# Patient Record
Sex: MALE | Race: OTHER | NOT HISPANIC OR LATINO | URBAN - METROPOLITAN AREA
[De-identification: names, ages, dates, MRNs, and addresses within clinical notes are randomized per-mention and may not be internally consistent; named-entity substitution may affect disease eponyms.]

---

## 2021-02-26 VITALS
DIASTOLIC BLOOD PRESSURE: 78 MMHG | OXYGEN SATURATION: 99 % | RESPIRATION RATE: 16 BRPM | TEMPERATURE: 98 F | HEART RATE: 88 BPM | SYSTOLIC BLOOD PRESSURE: 149 MMHG

## 2021-02-26 NOTE — H&P ADULT - NSHPSOCIALHISTORY_GEN_ALL_CORE
Denies tobacco/alcohol/illicit drug use Denies tobacco  Former alcohol use - 2 shots 5 days/week, haven't drank in 10 days  Denies illicit drug use

## 2021-02-26 NOTE — H&P ADULT - HISTORY OF PRESENT ILLNESS
Skeleton    Covid___  Cardiologist: Dr. Shaik Huerta  Pharmacy:  Escort:    59 yo M with PMHx of HTN, HLD, DM who presented to Cardiologist Dr. Shaik Huerta with c/o CP, SOB, dizziness    NST 2/16/21: moderate inferior and inferoseptal defect noted, normal LVEF.  Covid test 2/27/21 @ [place] [facility] @ 7a.   Cardiologist: Dr. Shaik Huerta  Pharmacy: [facility] - [phone]  Escort: Daughter    59 yo M with PMHx of HTN, HLD, DM who presented to Cardiologist Dr. Shaik Huerta with c/o non-radiating SSCP described as heaviness and of mild intensity upon ambulation of 1/2 city block, resolving with a couple minutes of rest, over past 6-7 blocks. Denies SOB, dizziness, diaphoresis, palpitations, fatigue, LE edema, orthopnea, PND, syncope, N/V, abdominal pain. NST 2/16/21: moderate inferior and inferoseptal defect noted, normal LVEF.  In light of pt's risk factors, CCS Anginal class III Sx, abnormal NST, pt referred for cardiac cath with possible intervention to r/o underlying CAD.  Covid test 2/27/21 @ [place] [facility] @ 7a.   Cardiologist: Dr. Shaik Huerta  Pharmacy: [facility] - [phone]  Escort: Daughter    59 yo M with PMHx of HTN, HLD, DM who presented to Cardiologist Dr. Shaik Huerta with c/o non-radiating SSCP described as heaviness and of mild intensity upon ambulation of 1/2 city block, resolving with a couple minutes of rest, over past 6-7 months. Denies SOB, dizziness, diaphoresis, palpitations, fatigue, LE edema, orthopnea, PND, syncope, N/V, abdominal pain. NST 2/16/21: moderate inferior and inferoseptal defect noted, normal LVEF.  In light of pt's risk factors, CCS Anginal class III Sx, abnormal NST, pt referred for cardiac cath with possible intervention to r/o underlying CAD.  Covid test 2/27/21 @ [place] [facility]  Cardiologist: Dr. Shaik Huerta  Pharmacy: [facility] - [phone]  Escort: Daughter    59 yo M with PMHx of HTN, HLD, DM who presented to Cardiologist Dr. Shaik Huerta with c/o non-radiating SSCP described as heaviness and of mild intensity upon ambulation of 1/2 city block, resolving with a couple minutes of rest, over past 6-7 months. Denies SOB, dizziness, diaphoresis, palpitations, fatigue, LE edema, orthopnea, PND, syncope, N/V, abdominal pain. NST 2/16/21: moderate inferior and inferoseptal defect noted, normal LVEF.  In light of pt's risk factors, CCS Anginal class III Sx, abnormal NST, pt referred for cardiac cath with possible intervention to r/o underlying CAD.

## 2021-02-26 NOTE — H&P ADULT - NSICDXFAMILYHX_GEN_ALL_CORE_FT
No pertinent family history in first degree relatives FAMILY HISTORY:  Family history of CVA, Father in his 60s

## 2021-02-26 NOTE — H&P ADULT - ASSESSMENT
61 yo M with PMHx of HTN, HLD, DM who presented to Cardiologist Dr. Shaik Huerta with c/o exertional SSCP x 6-7 months. NST 2/16/21: moderate inferior and inferoseptal defect noted, normal LVEF. Pt now presents for cardiac cath w/ possible intervention if clinically indicated.     Denies bleeding, hematuria, hematochezia, melena. Aspirin 325mg and Plavix 600mg ordered pre-cath.   Euvolemic, normal EF. NS @ 75cc/hour x 4h ordered pre-cath.   Mallampati Class   ASA Class II  Pt is a suitable candidate for moderate sedation.   Risks & benefits of procedure and alternative therapy have been explained to the patient including but not limited to: allergic reaction, bleeding w/possible need for blood transfusion, infection, renal and vascular compromise, limb damage, arrhythmia, stroke, vessel dissection/perforation, Myocardial infarction, emergent CABG. Informed consent obtained and in chart.   59 yo M with PMHx of HTN, HLD, DM who presented to Cardiologist Dr. Shaik Huerta with c/o exertional SSCP x 6-7 months. NST 2/16/21: moderate inferior and inferoseptal defect noted, normal LVEF. Pt now presents for cardiac cath w/ possible intervention if clinically indicated.     Denies bleeding, hematuria, hematochezia, melena. On aspirin 81mg daily. Aspirin 81mg and Plavix 600mg ordered pre-cath.   Euvolemic, normal EF. NS @ 75cc/hour x 4h ordered pre-cath.   ISS ordered    Mallampati Class II  ASA Class II  Pt is a suitable candidate for moderate sedation.   Risks & benefits of procedure and alternative therapy have been explained to the patient including but not limited to: allergic reaction, bleeding w/possible need for blood transfusion, infection, renal and vascular compromise, limb damage, arrhythmia, stroke, vessel dissection/perforation, Myocardial infarction, emergent CABG. Informed consent obtained and in chart.

## 2021-03-01 ENCOUNTER — INPATIENT (INPATIENT)
Facility: HOSPITAL | Age: 61
LOS: 0 days | Discharge: HOME | DRG: 247 | End: 2021-03-02
Attending: INTERNAL MEDICINE | Admitting: INTERNAL MEDICINE
Payer: MEDICAID

## 2021-03-01 ENCOUNTER — TRANSCRIPTION ENCOUNTER (OUTPATIENT)
Age: 61
End: 2021-03-01

## 2021-03-01 LAB
A1C WITH ESTIMATED AVERAGE GLUCOSE RESULT: 7.4 % — HIGH (ref 4–5.6)
ALBUMIN SERPL ELPH-MCNC: 4.8 G/DL — SIGNIFICANT CHANGE UP (ref 3.3–5)
ALP SERPL-CCNC: 80 U/L — SIGNIFICANT CHANGE UP (ref 40–120)
ALT FLD-CCNC: 41 U/L — SIGNIFICANT CHANGE UP (ref 10–45)
ANION GAP SERPL CALC-SCNC: 12 MMOL/L — SIGNIFICANT CHANGE UP (ref 5–17)
APTT BLD: 39.7 SEC — HIGH (ref 27.5–35.5)
AST SERPL-CCNC: 27 U/L — SIGNIFICANT CHANGE UP (ref 10–40)
BASOPHILS # BLD AUTO: 0.02 K/UL — SIGNIFICANT CHANGE UP (ref 0–0.2)
BASOPHILS NFR BLD AUTO: 0.4 % — SIGNIFICANT CHANGE UP (ref 0–2)
BILIRUB SERPL-MCNC: 0.7 MG/DL — SIGNIFICANT CHANGE UP (ref 0.2–1.2)
BUN SERPL-MCNC: 11 MG/DL — SIGNIFICANT CHANGE UP (ref 7–23)
CALCIUM SERPL-MCNC: 9.2 MG/DL — SIGNIFICANT CHANGE UP (ref 8.4–10.5)
CHLORIDE SERPL-SCNC: 97 MMOL/L — SIGNIFICANT CHANGE UP (ref 96–108)
CHOLEST SERPL-MCNC: 136 MG/DL — SIGNIFICANT CHANGE UP
CK MB CFR SERPL CALC: 2.2 NG/ML — SIGNIFICANT CHANGE UP (ref 0–6.7)
CK SERPL-CCNC: 153 U/L — SIGNIFICANT CHANGE UP (ref 30–200)
CO2 SERPL-SCNC: 25 MMOL/L — SIGNIFICANT CHANGE UP (ref 22–31)
CREAT SERPL-MCNC: 1.04 MG/DL — SIGNIFICANT CHANGE UP (ref 0.5–1.3)
CRP SERPL-MCNC: <0.03 MG/DL — SIGNIFICANT CHANGE UP (ref 0–0.4)
EOSINOPHIL # BLD AUTO: 0.03 K/UL — SIGNIFICANT CHANGE UP (ref 0–0.5)
EOSINOPHIL NFR BLD AUTO: 0.5 % — SIGNIFICANT CHANGE UP (ref 0–6)
ESTIMATED AVERAGE GLUCOSE: 166 MG/DL — HIGH (ref 68–114)
GLUCOSE BLDC GLUCOMTR-MCNC: 103 MG/DL — HIGH (ref 70–99)
GLUCOSE BLDC GLUCOMTR-MCNC: 125 MG/DL — HIGH (ref 70–99)
GLUCOSE BLDC GLUCOMTR-MCNC: 172 MG/DL — HIGH (ref 70–99)
GLUCOSE BLDC GLUCOMTR-MCNC: 211 MG/DL — HIGH (ref 70–99)
GLUCOSE SERPL-MCNC: 179 MG/DL — HIGH (ref 70–99)
HCT VFR BLD CALC: 39.1 % — SIGNIFICANT CHANGE UP (ref 39–50)
HDLC SERPL-MCNC: 45 MG/DL — SIGNIFICANT CHANGE UP
HGB BLD-MCNC: 13.3 G/DL — SIGNIFICANT CHANGE UP (ref 13–17)
IMM GRANULOCYTES NFR BLD AUTO: 0.4 % — SIGNIFICANT CHANGE UP (ref 0–1.5)
INR BLD: 0.98 — SIGNIFICANT CHANGE UP (ref 0.88–1.16)
LIPID PNL WITH DIRECT LDL SERPL: 63 MG/DL — SIGNIFICANT CHANGE UP
LYMPHOCYTES # BLD AUTO: 2 K/UL — SIGNIFICANT CHANGE UP (ref 1–3.3)
LYMPHOCYTES # BLD AUTO: 36.4 % — SIGNIFICANT CHANGE UP (ref 13–44)
MCHC RBC-ENTMCNC: 30.2 PG — SIGNIFICANT CHANGE UP (ref 27–34)
MCHC RBC-ENTMCNC: 34 GM/DL — SIGNIFICANT CHANGE UP (ref 32–36)
MCV RBC AUTO: 88.9 FL — SIGNIFICANT CHANGE UP (ref 80–100)
MONOCYTES # BLD AUTO: 0.42 K/UL — SIGNIFICANT CHANGE UP (ref 0–0.9)
MONOCYTES NFR BLD AUTO: 7.7 % — SIGNIFICANT CHANGE UP (ref 2–14)
NEUTROPHILS # BLD AUTO: 3 K/UL — SIGNIFICANT CHANGE UP (ref 1.8–7.4)
NEUTROPHILS NFR BLD AUTO: 54.6 % — SIGNIFICANT CHANGE UP (ref 43–77)
NON HDL CHOLESTEROL: 91 MG/DL — SIGNIFICANT CHANGE UP
NRBC # BLD: 0 /100 WBCS — SIGNIFICANT CHANGE UP (ref 0–0)
PLATELET # BLD AUTO: 205 K/UL — SIGNIFICANT CHANGE UP (ref 150–400)
POTASSIUM SERPL-MCNC: 4.2 MMOL/L — SIGNIFICANT CHANGE UP (ref 3.5–5.3)
POTASSIUM SERPL-SCNC: 4.2 MMOL/L — SIGNIFICANT CHANGE UP (ref 3.5–5.3)
PROT SERPL-MCNC: 8 G/DL — SIGNIFICANT CHANGE UP (ref 6–8.3)
PROTHROM AB SERPL-ACNC: 11.8 SEC — SIGNIFICANT CHANGE UP (ref 10.6–13.6)
RBC # BLD: 4.4 M/UL — SIGNIFICANT CHANGE UP (ref 4.2–5.8)
RBC # FLD: 11.7 % — SIGNIFICANT CHANGE UP (ref 10.3–14.5)
SODIUM SERPL-SCNC: 134 MMOL/L — LOW (ref 135–145)
TRIGL SERPL-MCNC: 139 MG/DL — SIGNIFICANT CHANGE UP
WBC # BLD: 5.49 K/UL — SIGNIFICANT CHANGE UP (ref 3.8–10.5)
WBC # FLD AUTO: 5.49 K/UL — SIGNIFICANT CHANGE UP (ref 3.8–10.5)

## 2021-03-01 PROCEDURE — 92929: CPT | Mod: LC

## 2021-03-01 PROCEDURE — 93010 ELECTROCARDIOGRAM REPORT: CPT

## 2021-03-01 PROCEDURE — 93458 L HRT ARTERY/VENTRICLE ANGIO: CPT | Mod: 26,59

## 2021-03-01 PROCEDURE — 92928 PRQ TCAT PLMT NTRAC ST 1 LES: CPT | Mod: LC

## 2021-03-01 RX ORDER — SODIUM CHLORIDE 9 MG/ML
1000 INJECTION, SOLUTION INTRAVENOUS
Refills: 0 | Status: DISCONTINUED | OUTPATIENT
Start: 2021-03-01 | End: 2021-03-01

## 2021-03-01 RX ORDER — GLUCAGON INJECTION, SOLUTION 0.5 MG/.1ML
1 INJECTION, SOLUTION SUBCUTANEOUS ONCE
Refills: 0 | Status: DISCONTINUED | OUTPATIENT
Start: 2021-03-01 | End: 2021-03-02

## 2021-03-01 RX ORDER — DEXTROSE 50 % IN WATER 50 %
12.5 SYRINGE (ML) INTRAVENOUS ONCE
Refills: 0 | Status: DISCONTINUED | OUTPATIENT
Start: 2021-03-01 | End: 2021-03-02

## 2021-03-01 RX ORDER — DEXTROSE 50 % IN WATER 50 %
25 SYRINGE (ML) INTRAVENOUS ONCE
Refills: 0 | Status: DISCONTINUED | OUTPATIENT
Start: 2021-03-01 | End: 2021-03-02

## 2021-03-01 RX ORDER — CHLORHEXIDINE GLUCONATE 213 G/1000ML
1 SOLUTION TOPICAL ONCE
Refills: 0 | Status: DISCONTINUED | OUTPATIENT
Start: 2021-03-01 | End: 2021-03-01

## 2021-03-01 RX ORDER — ASPIRIN/CALCIUM CARB/MAGNESIUM 324 MG
81 TABLET ORAL DAILY
Refills: 0 | Status: DISCONTINUED | OUTPATIENT
Start: 2021-03-02 | End: 2021-03-02

## 2021-03-01 RX ORDER — INSULIN LISPRO 100/ML
VIAL (ML) SUBCUTANEOUS ONCE
Refills: 0 | Status: DISCONTINUED | OUTPATIENT
Start: 2021-03-01 | End: 2021-03-01

## 2021-03-01 RX ORDER — DEXTROSE 50 % IN WATER 50 %
25 SYRINGE (ML) INTRAVENOUS ONCE
Refills: 0 | Status: DISCONTINUED | OUTPATIENT
Start: 2021-03-01 | End: 2021-03-01

## 2021-03-01 RX ORDER — SODIUM CHLORIDE 9 MG/ML
1000 INJECTION, SOLUTION INTRAVENOUS
Refills: 0 | Status: DISCONTINUED | OUTPATIENT
Start: 2021-03-01 | End: 2021-03-02

## 2021-03-01 RX ORDER — ROSUVASTATIN CALCIUM 5 MG/1
1 TABLET ORAL
Qty: 0 | Refills: 0 | DISCHARGE

## 2021-03-01 RX ORDER — CLOPIDOGREL BISULFATE 75 MG/1
600 TABLET, FILM COATED ORAL ONCE
Refills: 0 | Status: COMPLETED | OUTPATIENT
Start: 2021-03-01 | End: 2021-03-01

## 2021-03-01 RX ORDER — ATORVASTATIN CALCIUM 80 MG/1
10 TABLET, FILM COATED ORAL AT BEDTIME
Refills: 0 | Status: DISCONTINUED | OUTPATIENT
Start: 2021-03-01 | End: 2021-03-01

## 2021-03-01 RX ORDER — SODIUM CHLORIDE 9 MG/ML
500 INJECTION INTRAMUSCULAR; INTRAVENOUS; SUBCUTANEOUS
Refills: 0 | Status: DISCONTINUED | OUTPATIENT
Start: 2021-03-01 | End: 2021-03-01

## 2021-03-01 RX ORDER — DEXTROSE 50 % IN WATER 50 %
15 SYRINGE (ML) INTRAVENOUS ONCE
Refills: 0 | Status: DISCONTINUED | OUTPATIENT
Start: 2021-03-01 | End: 2021-03-02

## 2021-03-01 RX ORDER — INSULIN LISPRO 100/ML
VIAL (ML) SUBCUTANEOUS
Refills: 0 | Status: DISCONTINUED | OUTPATIENT
Start: 2021-03-01 | End: 2021-03-02

## 2021-03-01 RX ORDER — DEXTROSE 50 % IN WATER 50 %
15 SYRINGE (ML) INTRAVENOUS ONCE
Refills: 0 | Status: DISCONTINUED | OUTPATIENT
Start: 2021-03-01 | End: 2021-03-01

## 2021-03-01 RX ORDER — AMLODIPINE BESYLATE 2.5 MG/1
5 TABLET ORAL DAILY
Refills: 0 | Status: DISCONTINUED | OUTPATIENT
Start: 2021-03-01 | End: 2021-03-02

## 2021-03-01 RX ORDER — HYDROCHLOROTHIAZIDE 25 MG
25 TABLET ORAL DAILY
Refills: 0 | Status: DISCONTINUED | OUTPATIENT
Start: 2021-03-02 | End: 2021-03-02

## 2021-03-01 RX ORDER — SODIUM CHLORIDE 9 MG/ML
500 INJECTION INTRAMUSCULAR; INTRAVENOUS; SUBCUTANEOUS
Refills: 0 | Status: DISCONTINUED | OUTPATIENT
Start: 2021-03-01 | End: 2021-03-02

## 2021-03-01 RX ORDER — CLOPIDOGREL BISULFATE 75 MG/1
75 TABLET, FILM COATED ORAL DAILY
Refills: 0 | Status: DISCONTINUED | OUTPATIENT
Start: 2021-03-02 | End: 2021-03-02

## 2021-03-01 RX ORDER — ASPIRIN/CALCIUM CARB/MAGNESIUM 324 MG
81 TABLET ORAL ONCE
Refills: 0 | Status: COMPLETED | OUTPATIENT
Start: 2021-03-01 | End: 2021-03-01

## 2021-03-01 RX ORDER — LOSARTAN POTASSIUM 100 MG/1
1 TABLET, FILM COATED ORAL
Qty: 0 | Refills: 0 | DISCHARGE

## 2021-03-01 RX ORDER — LOSARTAN POTASSIUM 100 MG/1
100 TABLET, FILM COATED ORAL DAILY
Refills: 0 | Status: DISCONTINUED | OUTPATIENT
Start: 2021-03-02 | End: 2021-03-02

## 2021-03-01 RX ORDER — ATORVASTATIN CALCIUM 80 MG/1
40 TABLET, FILM COATED ORAL AT BEDTIME
Refills: 0 | Status: DISCONTINUED | OUTPATIENT
Start: 2021-03-01 | End: 2021-03-02

## 2021-03-01 RX ORDER — DEXTROSE 50 % IN WATER 50 %
12.5 SYRINGE (ML) INTRAVENOUS ONCE
Refills: 0 | Status: DISCONTINUED | OUTPATIENT
Start: 2021-03-01 | End: 2021-03-01

## 2021-03-01 RX ORDER — GLUCAGON INJECTION, SOLUTION 0.5 MG/.1ML
1 INJECTION, SOLUTION SUBCUTANEOUS ONCE
Refills: 0 | Status: DISCONTINUED | OUTPATIENT
Start: 2021-03-01 | End: 2021-03-01

## 2021-03-01 RX ADMIN — Medication 81 MILLIGRAM(S): at 09:38

## 2021-03-01 RX ADMIN — Medication 2: at 17:52

## 2021-03-01 RX ADMIN — CLOPIDOGREL BISULFATE 600 MILLIGRAM(S): 75 TABLET, FILM COATED ORAL at 09:38

## 2021-03-01 RX ADMIN — SODIUM CHLORIDE 75 MILLILITER(S): 9 INJECTION INTRAMUSCULAR; INTRAVENOUS; SUBCUTANEOUS at 09:39

## 2021-03-01 RX ADMIN — SODIUM CHLORIDE 75 MILLILITER(S): 9 INJECTION INTRAMUSCULAR; INTRAVENOUS; SUBCUTANEOUS at 15:24

## 2021-03-01 RX ADMIN — ATORVASTATIN CALCIUM 40 MILLIGRAM(S): 80 TABLET, FILM COATED ORAL at 22:15

## 2021-03-01 RX ADMIN — SODIUM CHLORIDE 75 MILLILITER(S): 9 INJECTION INTRAMUSCULAR; INTRAVENOUS; SUBCUTANEOUS at 14:15

## 2021-03-01 NOTE — DISCHARGE NOTE PROVIDER - HOSPITAL COURSE
59 yo M with PMHx of HTN, HLD, DM who presented to Cardiologist Dr. Shaik Huerta with c/o non-radiating SSCP described as heaviness and of mild intensity upon ambulation of 1/2 city block, resolving with a couple minutes of rest, over past 6-7 months. Denies SOB, dizziness, diaphoresis, palpitations, fatigue, LE edema, orthopnea, PND, syncope, N/V, abdominal pain. NST 2/16/21: moderate inferior and inferoseptal defect noted, normal LVEF.  In light of pt's risk factors, CCS Anginal class III Sx, abnormal NST, pt referred for cardiac cath with possible intervention to r/o underlying CAD.     Pt is now s/p cardiac cath (3/1/21) w/ JODY to mLCx, JODY OM2; Residual pLAD (80%) and pD1 75%, mRCA 50%; EDP 10mmHg. Access site = right radial artery. PLAN for patient to return for staged PCI of LAD lesion in 5 weeks.     On day of discharge patient seen and evaluated at bedside. VSS, no events on telemetry, labs unremarkable, and physical exam benign and right radial access site stable without hematoma/bleeding. Hospital course reviewed with attending cardiologist and patient deemed stable for discharge home. Patient will return in 5 weeks for staged intervention of above mentioned LAD lesion. Discharge instructions reviewed with patient and patient verbalizes understanding.     DAPT: ASA/Plavix  STATIN: Atorvastatin 40mg PO QHS (increased from home Atorvastatin 10mg PO QHS)  F/U CARDS: Dr. Huerta 61 yo M with PMHx of HTN, HLD, DM who presented to Cardiologist Dr. Shaik Huerta with c/o non-radiating SSCP described as heaviness and of mild intensity upon ambulation of 1/2 city block, resolving with a couple minutes of rest, over past 6-7 months. Denies SOB, dizziness, diaphoresis, palpitations, fatigue, LE edema, orthopnea, PND, syncope, N/V, abdominal pain. NST 2/16/21: moderate inferior and inferoseptal defect noted, normal LVEF.  In light of pt's risk factors, CCS Anginal class III Sx, abnormal NST, pt referred for cardiac cath with possible intervention to r/o underlying CAD.     Pt is now s/p cardiac cath (3/1/21) w/ JODY to mLCx, JODY OM2; Residual pLAD (80%) and pD1 75%, mRCA 50%; EDP 10mmHg. Access site = right radial artery. PLAN for patient to return for staged PCI of LAD lesion in 5 weeks.     On day of discharge patient seen and evaluated at bedside. VSS, no events on telemetry, labs unremarkable, and physical exam benign and right radial access site stable without hematoma/bleeding. Hospital course reviewed with attending cardiologist and patient C/P free and deemed stable for discharge home. Patient will return in 5 weeks for staged intervention of above mentioned LAD lesion. Discharge instructions reviewed with patient and patient verbalizes understanding.     DAPT: ASA/Plavix  STATIN: Atorvastatin 40mg PO QHS (increased from home Atorvastatin 10mg PO QHS)  F/U CARDS: Dr. Huerta 61 yo M with PMHx of HTN, HLD, DM who presented to Cardiologist Dr. Shaik Huerta with c/o non-radiating SSCP described as heaviness and of mild intensity upon ambulation of 1/2 city block, resolving with a couple minutes of rest, over past 6-7 months. Denies SOB, dizziness, diaphoresis, palpitations, fatigue, LE edema, orthopnea, PND, syncope, N/V, abdominal pain. NST 2/16/21: moderate inferior and inferoseptal defect noted, normal LVEF.  In light of pt's risk factors, CCS Anginal class III Sx, abnormal NST, pt referred for cardiac cath with possible intervention to r/o underlying CAD.     Pt is now s/p cardiac cath (3/1/21) w/ JODY to mLCx, JODY OM2; Residual pLAD (80%) and pD1 75%, mRCA 50%; EDP 10mmHg. Access site = right radial artery. PLAN for patient to return for staged PCI of LAD lesion in 5 weeks.     On day of discharge patient seen and evaluated at bedside. VSS, no events on telemetry, labs unremarkable, and physical exam benign and right radial access site stable without hematoma/bleeding. Hospital course reviewed with attending cardiologist and patient C/P free and deemed stable for discharge home. Patient will return in 5 weeks for staged intervention of above mentioned LAD lesion. Patient has been given appropriate discharge instructions including medication regimen, access site management and follow up. Prescriptions have been e-prescribed to patient's preferred pharmacy. Discharge instructions reviewed with patient and patient verbalized understanding     DAPT: ASA/Plavix  STATIN: Atorvastatin 40mg PO QHS (increased from home Atorvastatin 10mg PO QHS)  F/U CARDS: Dr. Huerta

## 2021-03-01 NOTE — DISCHARGE NOTE PROVIDER - NSDCFUADDAPPT_GEN_ALL_CORE_FT
(1) Please follow up with your cardiologist, Dr. Huerta, within 1-2 weeks of discharge home from the hospital.

## 2021-03-01 NOTE — DISCHARGE NOTE PROVIDER - NSDCMRMEDTOKEN_GEN_ALL_CORE_FT
amLODIPine 5 mg oral tablet: 1 tab(s) orally once a day  aspirin 81 mg oral delayed release tablet: 1 tab(s) orally once a day  atorvastatin 10 mg oral tablet: 1 tab(s) orally once a day  glimepiride 2 mg oral tablet: 1 tab(s) orally once a day  Januvia 50 mg oral tablet: 1 tab(s) orally once a day  losartan-hydrochlorothiazide 100 mg-25 mg oral tablet: 1 tab(s) orally once a day  metFORMIN 850 mg oral tablet: 1 tab(s) orally 2 times a day (with meals)   amLODIPine 5 mg oral tablet: 1 tab(s) orally once a day  aspirin 81 mg oral delayed release tablet: 1 tab(s) orally once a day  atorvastatin 40 mg oral tablet: 1 tab(s) orally once a day (at bedtime)  clopidogrel 75 mg oral tablet: 1 tab(s) orally once a day  glimepiride 2 mg oral tablet: 1 tab(s) orally once a day  Januvia 50 mg oral tablet: 1 tab(s) orally once a day  losartan-hydrochlorothiazide 100 mg-25 mg oral tablet: 1 tab(s) orally once a day  metFORMIN 850 mg oral tablet: 1 tab(s) orally 2 times a day (with meals)

## 2021-03-01 NOTE — DISCHARGE NOTE PROVIDER - NSDCCPCAREPLAN_GEN_ALL_CORE_FT
PRINCIPAL DISCHARGE DIAGNOSIS  Diagnosis: CAD (coronary artery disease)  Assessment and Plan of Treatment: You underwent a cardiac angiogram and received a stent the middle Left Circumflex artery (mLCx) and a stent to your Second Obtuse Marginal artery (OM2). PLEASE CONTINUE ASPIRIN 81MG DAILY AND PLAVIX 75MG DAILY. DO NOT STOP THESE MEDICATIONS FOR ANY REASON AS THEY ARE KEEPING YOUR STENT OPEN AND PREVENTING A HEART ATTACK. Avoid strenuous activity or heavy lifting for the next five days. Do not take a bath or swim for the next five days; you may shower. For any bleeding or hematoma formation (hardened blood collection under the skin) at the access site of RIGHT WRIST please hold pressure and go to the emergency room. Please follow up with Dr. SÁNCHEZ in 1-2 weeks. For recurrent chest pain, please call your doctor or go to the emergency room.      SECONDARY DISCHARGE DIAGNOSES  Diagnosis: DM (diabetes mellitus)  Assessment and Plan of Treatment: Please continue medications as listed for diabetes. Maintain a low carbohydrate, low sugar diet. Check for your blood sugars regularly.   - RESTART your home METFORMIN on THURSDAY, MARCH 4TH, 2021.    Diagnosis: HLD (hyperlipidemia)  Assessment and Plan of Treatment: Please continue ATORVASTATIN 40MG ONCE DAILY AT BEDTIME to keep your cholesterol low. High cholesterol contributes to heart disease.    Diagnosis: HTN (hypertension)  Assessment and Plan of Treatment: Please continue medications as listed to keep your blood pressure controlled. For blood pressure that is too high or too low please see your doctor or go to the emergency room as necessary.     PRINCIPAL DISCHARGE DIAGNOSIS  Diagnosis: CAD (coronary artery disease)  Assessment and Plan of Treatment: You underwent a cardiac angiogram and received a stent the middle Left Circumflex artery (mLCx) and a stent to your Second Obtuse Marginal artery (OM2). PLEASE CONTINUE ASPIRIN 81MG DAILY. YOU WERE STARTED ON PLAVIX 75 MG BY MOUTH ONCE DAILY TO KEEP YOUR STENT OPEN. DO NOT STOP THESE MEDICATIONS FOR ANY REASON AS THEY ARE KEEPING YOUR STENT OPEN AND PREVENTING A HEART ATTACK. Avoid strenuous activity or heavy lifting for the next five days. Do not take a bath or swim for the next five days; you may shower. For any bleeding or hematoma formation (hardened blood collection under the skin) at the access site of RIGHT WRIST please hold pressure and go to the emergency room. Please follow up with Dr. SÁNCHEZ in 1-2 weeks. For recurrent chest pain, please call your doctor or go to the emergency room.      SECONDARY DISCHARGE DIAGNOSES  Diagnosis: Diabetes mellitus type II, non insulin dependent  Assessment and Plan of Treatment: Please continue medications as listed for diabetes. Maintain a low carbohydrate, low sugar diet. Check for your blood sugars regularly.   - RESTART your home METFORMIN on THURSDAY, MARCH 4TH, 2021.   Your Hemoglobin A1C (average measurement of how well controlled your sugar levels are over a three month period) is 7.4%. Goal is <7%.    Diagnosis: HLD (hyperlipidemia)  Assessment and Plan of Treatment: Your Atorvastatin was increased to  ATORVASTATIN 40MG ONCE DAILY AT BEDTIME to keep your cholesterol low. High cholesterol contributes to heart disease. HAVE LIVER FUNCTION AND CHOLESTEROL LEVELS CHECKED IN 1 MONTH.    Diagnosis: HTN (hypertension)  Assessment and Plan of Treatment: Please continue medications as listed to keep your blood pressure controlled. For blood pressure that is too high or too low please see your doctor or go to the emergency room as necessary.

## 2021-03-01 NOTE — DISCHARGE NOTE PROVIDER - NSDCFUADDINST_GEN_ALL_CORE_FT
You are to return for another cardiac catheterization in 5 weeks in order to open up another blockage in one of your coronary arteries.  You are to return for another cardiac catheterization in 5 weeks in order to open up another blockage in one of your coronary arteries.     You underwent a cardiac angiogram and should follow-up with your cardiologist before returning to ordinary activities. Consult your doctor before returning to vigorous activity. You may return to work in 5 days. The catheter from your right wrist was removed.  Please avoid any heavy lifting (no more than 3 to 5 lbs) or strenuous activity until you follow-up with cardiologist.      You may shower once the dressing is removed, but avoid baths, hot tubs, or swimming for 5 days to prevent infection. If you notice bleeding from the site, hardening and pain at the site, drainage or redness from the site, coolness/paleness of the right arm, weakness/paralysis of right arm (unable to lift or move) swelling, or fever, please call [phone].

## 2021-03-01 NOTE — DISCHARGE NOTE PROVIDER - CARE PROVIDER_API CALL
Shaik LALI Huerta)  Cardiology; Nuclear Medicine  [address] [place] [address]  Phone: [phone]  Fax: [phone]  Follow Up Time:

## 2021-03-01 NOTE — PATIENT PROFILE ADULT - NSPROPOAPRESSUREINJURY_GEN_A_NUR
no Repair Performed By Another Provider Text (Leave Blank If You Do Not Want): After the tissue was excised the defect was repaired by another provider.

## 2021-03-02 ENCOUNTER — TRANSCRIPTION ENCOUNTER (OUTPATIENT)
Age: 61
End: 2021-03-02

## 2021-03-02 VITALS — TEMPERATURE: 98 F

## 2021-03-02 LAB
ALBUMIN SERPL ELPH-MCNC: 4 G/DL — SIGNIFICANT CHANGE UP (ref 3.3–5)
ALP SERPL-CCNC: 66 U/L — SIGNIFICANT CHANGE UP (ref 40–120)
ALT FLD-CCNC: 35 U/L — SIGNIFICANT CHANGE UP (ref 10–45)
ANION GAP SERPL CALC-SCNC: 9 MMOL/L — SIGNIFICANT CHANGE UP (ref 5–17)
AST SERPL-CCNC: 32 U/L — SIGNIFICANT CHANGE UP (ref 10–40)
BILIRUB SERPL-MCNC: 0.6 MG/DL — SIGNIFICANT CHANGE UP (ref 0.2–1.2)
BUN SERPL-MCNC: 14 MG/DL — SIGNIFICANT CHANGE UP (ref 7–23)
CALCIUM SERPL-MCNC: 9.5 MG/DL — SIGNIFICANT CHANGE UP (ref 8.4–10.5)
CHLORIDE SERPL-SCNC: 104 MMOL/L — SIGNIFICANT CHANGE UP (ref 96–108)
CO2 SERPL-SCNC: 24 MMOL/L — SIGNIFICANT CHANGE UP (ref 22–31)
CREAT SERPL-MCNC: 1.07 MG/DL — SIGNIFICANT CHANGE UP (ref 0.5–1.3)
GLUCOSE BLDC GLUCOMTR-MCNC: 134 MG/DL — HIGH (ref 70–99)
GLUCOSE BLDC GLUCOMTR-MCNC: 201 MG/DL — HIGH (ref 70–99)
GLUCOSE SERPL-MCNC: 132 MG/DL — HIGH (ref 70–99)
HCT VFR BLD CALC: 35.8 % — LOW (ref 39–50)
HCV AB S/CO SERPL IA: 0.07 S/CO — SIGNIFICANT CHANGE UP
HCV AB SERPL-IMP: SIGNIFICANT CHANGE UP
HGB BLD-MCNC: 12.3 G/DL — LOW (ref 13–17)
MAGNESIUM SERPL-MCNC: 1.9 MG/DL — SIGNIFICANT CHANGE UP (ref 1.6–2.6)
MCHC RBC-ENTMCNC: 30.8 PG — SIGNIFICANT CHANGE UP (ref 27–34)
MCHC RBC-ENTMCNC: 34.4 GM/DL — SIGNIFICANT CHANGE UP (ref 32–36)
MCV RBC AUTO: 89.7 FL — SIGNIFICANT CHANGE UP (ref 80–100)
NRBC # BLD: 0 /100 WBCS — SIGNIFICANT CHANGE UP (ref 0–0)
PLATELET # BLD AUTO: 175 K/UL — SIGNIFICANT CHANGE UP (ref 150–400)
POTASSIUM SERPL-MCNC: 4.1 MMOL/L — SIGNIFICANT CHANGE UP (ref 3.5–5.3)
POTASSIUM SERPL-SCNC: 4.1 MMOL/L — SIGNIFICANT CHANGE UP (ref 3.5–5.3)
PROT SERPL-MCNC: 6.6 G/DL — SIGNIFICANT CHANGE UP (ref 6–8.3)
RBC # BLD: 3.99 M/UL — LOW (ref 4.2–5.8)
RBC # FLD: 11.6 % — SIGNIFICANT CHANGE UP (ref 10.3–14.5)
SODIUM SERPL-SCNC: 137 MMOL/L — SIGNIFICANT CHANGE UP (ref 135–145)
WBC # BLD: 5.66 K/UL — SIGNIFICANT CHANGE UP (ref 3.8–10.5)
WBC # FLD AUTO: 5.66 K/UL — SIGNIFICANT CHANGE UP (ref 3.8–10.5)

## 2021-03-02 PROCEDURE — 99239 HOSP IP/OBS DSCHRG MGMT >30: CPT

## 2021-03-02 RX ORDER — CLOPIDOGREL BISULFATE 75 MG/1
1 TABLET, FILM COATED ORAL
Qty: 30 | Refills: 11
Start: 2021-03-02 | End: 2022-02-24

## 2021-03-02 RX ORDER — MAGNESIUM OXIDE 400 MG ORAL TABLET 241.3 MG
800 TABLET ORAL ONCE
Refills: 0 | Status: COMPLETED | OUTPATIENT
Start: 2021-03-02 | End: 2021-03-02

## 2021-03-02 RX ORDER — ASPIRIN/CALCIUM CARB/MAGNESIUM 324 MG
1 TABLET ORAL
Qty: 30 | Refills: 11
Start: 2021-03-02 | End: 2022-02-24

## 2021-03-02 RX ORDER — ATORVASTATIN CALCIUM 80 MG/1
1 TABLET, FILM COATED ORAL
Qty: 30 | Refills: 2
Start: 2021-03-02 | End: 2021-05-30

## 2021-03-02 RX ORDER — ASPIRIN/CALCIUM CARB/MAGNESIUM 324 MG
1 TABLET ORAL
Qty: 0 | Refills: 0 | DISCHARGE

## 2021-03-02 RX ORDER — ATORVASTATIN CALCIUM 80 MG/1
1 TABLET, FILM COATED ORAL
Qty: 0 | Refills: 0 | DISCHARGE

## 2021-03-02 RX ADMIN — AMLODIPINE BESYLATE 5 MILLIGRAM(S): 2.5 TABLET ORAL at 06:01

## 2021-03-02 RX ADMIN — MAGNESIUM OXIDE 400 MG ORAL TABLET 800 MILLIGRAM(S): 241.3 TABLET ORAL at 10:05

## 2021-03-02 RX ADMIN — CLOPIDOGREL BISULFATE 75 MILLIGRAM(S): 75 TABLET, FILM COATED ORAL at 10:05

## 2021-03-02 RX ADMIN — Medication 81 MILLIGRAM(S): at 10:05

## 2021-03-02 NOTE — CHART NOTE - NSCHARTNOTEFT_GEN_A_CORE
Coronary Angiography  Reason for admission: Complex bifurcation PCI of LCX, >200 cc contrast requiring hydration  Syntax score: intermediate  Frailty index 5    LMCA: Normal  LAD: Prox 80% stenosis  D1: Prox 75% stenosis  LCX: Mid 90% stenosis  OM2: Prox 80% stenosis  RCA: Mid 50% stenosis    LHC:   LVEF 60%  LVEDP 10 mmHg  No AS or MR    Intervention  - Successful PCI of bifurcating LCX mid 90% stenosis and OM2 80% stenosis with a 2.5 x 30 mm Resolute [place] JODY in the LCX and 2.25 x 26 mm Resolute [place] JODY in OM2. 0% residual stenosis, ABDIAZIZ 3 flow, and excellent angiographic result    Radial band placed over right radial artery access site with adequate hemostasis prior to leaving the cath lab.  No procedural complications    Recommendations  - IVF hydration with NS at 75 cc/hr x 12 hours for >200 cc contrast  - DAPT with aspirin 81 mg daily indefinitely and plavix 75 mg daily for at least 6 months, preferably at least 1 year  - Optimize medical therapy   - Risk factor modification  - Follow up with outpatient cardiologist after discharge  - Staged PCI of LAD in 5 weeks if clinically indicated

## 2021-03-02 NOTE — DISCHARGE NOTE NURSING/CASE MANAGEMENT/SOCIAL WORK - PATIENT PORTAL LINK FT
You can access the FollowMyHealth Patient Portal offered by [facility] by registering at the following website: [email or web address]. By joining [facility]’s FollowMyHealth portal, you will also be able to view your health information using other applications (apps) compatible with our system.

## 2021-03-08 DIAGNOSIS — E11.9 TYPE 2 DIABETES MELLITUS WITHOUT COMPLICATIONS: ICD-10-CM

## 2021-03-08 DIAGNOSIS — I25.10 ATHEROSCLEROTIC HEART DISEASE OF NATIVE CORONARY ARTERY WITHOUT ANGINA PECTORIS: ICD-10-CM

## 2021-03-08 DIAGNOSIS — E78.5 HYPERLIPIDEMIA, UNSPECIFIED: ICD-10-CM

## 2021-03-08 DIAGNOSIS — Z79.84 LONG TERM (CURRENT) USE OF ORAL HYPOGLYCEMIC DRUGS: ICD-10-CM

## 2021-03-08 DIAGNOSIS — Z82.3 FAMILY HISTORY OF STROKE: ICD-10-CM

## 2021-03-08 DIAGNOSIS — Z79.82 LONG TERM (CURRENT) USE OF ASPIRIN: ICD-10-CM

## 2021-03-08 DIAGNOSIS — I10 ESSENTIAL (PRIMARY) HYPERTENSION: ICD-10-CM

## 2021-03-15 PROCEDURE — C1874: CPT

## 2021-03-15 PROCEDURE — 82550 ASSAY OF CK (CPK): CPT

## 2021-03-15 PROCEDURE — 80061 LIPID PANEL: CPT

## 2021-03-15 PROCEDURE — 80053 COMPREHEN METABOLIC PANEL: CPT

## 2021-03-15 PROCEDURE — C1769: CPT

## 2021-03-15 PROCEDURE — 85025 COMPLETE CBC W/AUTO DIFF WBC: CPT

## 2021-03-15 PROCEDURE — 86140 C-REACTIVE PROTEIN: CPT

## 2021-03-15 PROCEDURE — C1894: CPT

## 2021-03-15 PROCEDURE — 83735 ASSAY OF MAGNESIUM: CPT

## 2021-03-15 PROCEDURE — 85610 PROTHROMBIN TIME: CPT

## 2021-03-15 PROCEDURE — C1725: CPT

## 2021-03-15 PROCEDURE — 82553 CREATINE MB FRACTION: CPT

## 2021-03-15 PROCEDURE — 83036 HEMOGLOBIN GLYCOSYLATED A1C: CPT

## 2021-03-15 PROCEDURE — 85027 COMPLETE CBC AUTOMATED: CPT

## 2021-03-15 PROCEDURE — 86803 HEPATITIS C AB TEST: CPT

## 2021-03-15 PROCEDURE — C1887: CPT

## 2021-03-15 PROCEDURE — 85730 THROMBOPLASTIN TIME PARTIAL: CPT

## 2021-03-15 PROCEDURE — 82962 GLUCOSE BLOOD TEST: CPT

## 2021-03-15 PROCEDURE — 93005 ELECTROCARDIOGRAM TRACING: CPT

## 2021-03-15 PROCEDURE — 36415 COLL VENOUS BLD VENIPUNCTURE: CPT

## 2021-04-06 PROBLEM — E78.5 HYPERLIPIDEMIA, UNSPECIFIED: Chronic | Status: ACTIVE | Noted: 2021-02-26

## 2021-04-06 PROBLEM — I10 ESSENTIAL (PRIMARY) HYPERTENSION: Chronic | Status: ACTIVE | Noted: 2021-02-26

## 2021-04-06 PROBLEM — E11.9 TYPE 2 DIABETES MELLITUS WITHOUT COMPLICATIONS: Chronic | Status: ACTIVE | Noted: 2021-02-26

## 2021-04-09 VITALS
OXYGEN SATURATION: 96 % | HEART RATE: 76 BPM | RESPIRATION RATE: 16 BRPM | SYSTOLIC BLOOD PRESSURE: 169 MMHG | DIASTOLIC BLOOD PRESSURE: 76 MMHG | TEMPERATURE: 97 F

## 2021-04-09 RX ORDER — CHLORHEXIDINE GLUCONATE 213 G/1000ML
1 SOLUTION TOPICAL ONCE
Refills: 0 | Status: DISCONTINUED | OUTPATIENT
Start: 2021-04-12 | End: 2021-04-13

## 2021-04-09 NOTE — H&P ADULT - NSHPLABSRESULTS_GEN_ALL_CORE
13.0   4.91  )-----------( 227      ( 12 Apr 2021 07:16 )             38.2       04-12    137  |  99  |  15  ----------------------------<  141<H>  3.9   |  26  |  1.07    Ca    9.8      12 Apr 2021 07:16    TPro  7.7  /  Alb  4.5  /  TBili  0.4  /  DBili  x   /  AST  20  /  ALT  30  /  AlkPhos  75  04-12      PT/INR - ( 12 Apr 2021 07:16 )   PT: 11.5 sec;   INR: 0.96          PTT - ( 12 Apr 2021 07:16 )  PTT:37.8 sec              EKG: 13.0   4.91  )-----------( 227      ( 12 Apr 2021 07:16 )             38.2       04-12    137  |  99  |  15  ----------------------------<  141<H>  3.9   |  26  |  1.07    Ca    9.8      12 Apr 2021 07:16    TPro  7.7  /  Alb  4.5  /  TBili  0.4  /  DBili  x   /  AST  20  /  ALT  30  /  AlkPhos  75  04-12      PT/INR - ( 12 Apr 2021 07:16 )   PT: 11.5 sec;   INR: 0.96          PTT - ( 12 Apr 2021 07:16 )  PTT:37.8 sec              EKG: NSR at 71 bpm with 0.5-1 mm J point elevation V2-V3. Tall/Peaked T waves V3-V4. TWI III.

## 2021-04-09 NOTE — H&P ADULT - HISTORY OF PRESENT ILLNESS
COVID PCR _____  Pharmacy: [email or web address]  Escort:    61yo Male with PMHx of HTN, HLD, CAD s/p PCI (JODY OM2 and JODY mLCx with residual pLAD 80%, mD1 75% on 3/1/21), and DM who now returns to [facility] for staged PCI of residual CAD. Pt previously presented to his Cardiologist Dr. Shaik Huerta c/o non radiating substernal chest heaviness with ambulation of 0.5 block, resolved with rest, and pt underwent NST revealing moderate inferior and inferoseptal defect, normal EF and subsequently referred for cath. Pt currently endorses ________ and reports DAPT compliance. He denies any ___ CP, palpitations, dizziness, syncope, diaphoresis, fatigue, LE edema, SOB, GASTON, orthopnea, PND, N/V, fever, chills or recent sick contact. Pts most recent Cardiac Cath (3/1/21 @ [facility]) received JODY OM2 and JODY mLCx with residual pLAD 80%, mD1 75%, mRCA 50%, EF normal.   In light of pts risk factors, CCS class __ anginal symptoms and known residual CAD, pt now presents to [facility] for recommended staged PCI. COVID PCR: ________ 4/9/21 at [facility]  Pharmacy: [facility]  Escort: wife    59yo Male with PMHx of HTN, HLD, CAD s/p PCI (JODY OM2 and JODY mLCx with residual pLAD 80% and mD1 75% on 3/1/21), and DM who now returns to [facility] for staged PCI of residual CAD. Pt previously presented to his Cardiologist Dr. Shaik Huerta c/o non radiating substernal chest heaviness with ambulation of 0.5 block, resolved with rest, and pt underwent NST revealing moderate inferior and inferoseptal defect, normal EF and subsequently referred for cath. Pt currently endorses he feels well and reports DAPT compliance. He denies any CP, palpitations, dizziness, syncope, diaphoresis, fatigue, LE edema, GASTON, orthopnea, PND, N/V, fever, chills or recent sick contact. Pts most recent Cardiac Cath (3/1/21 @ [facility]) received JODY OM2 and JODY mLCx with residual pLAD 80%, mD1 75%, mRCA 50%, EF normal.   In light of pts risk factors and known residual CAD, pt now presents to [facility] for recommended staged PCI. COVID PCR:  4/9/21- negative in [facility]  Pharmacy:  Pharmacy  Escort: wife  Cardiologist: Dr. Shaik Huerta    59yo Male with PMHx of HTN, HLD, CAD s/p PCI (JODY OM2 and JODY mLCx with residual pLAD 80% and mD1 75% on 3/1/21), and DM who now returns to [facility] for staged PCI of residual CAD. Pt previously presented to his Cardiologist Dr. Shaik Huerta c/o non radiating substernal chest heaviness with ambulation of 0.5 block, resolved with rest, and pt underwent NST revealing moderate inferior and inferoseptal defect, normal EF and subsequently referred for cath. Pt currently endorses he feels well and reports DAPT compliance. He denies any CP, palpitations, dizziness, syncope, diaphoresis, fatigue, LE edema, GASTON, orthopnea, PND, N/V, fever, chills or recent sick contact. Pts most recent Cardiac Cath (3/1/21 @ [facility]) received JODY OM2 and JODY mLCx with residual pLAD 80%, mD1 75%, mRCA 50%, EF normal.   In light of pts risk factors and known residual CAD, pt now presents to [facility] for recommended staged PCI. COVID PCR:  4/9/21- negative in [facility]  Pharmacy:  Pharmacy  Escort: wife  Cardiologist: Dr. Shaik Huerta    61 yo Male with PMHx of HTN, HLD, CAD s/p PCI (JODY OM2 and JODY mLCx with residual pLAD 80% and mD1 75% on 3/1/21), and DM Type II who now returns to [facility] for staged PCI of residual CAD. Pt previously presented to his Cardiologist Dr. Shaik Huerta c/o non radiating substernal chest heaviness with ambulation of 0.5 block, resolved with rest, and pt underwent NST revealing moderate inferior and inferoseptal defect, normal EF and subsequently referred for cath. Pt currently endorses he feels well and reports DAPT compliance. He reports he still experiences exertional chest pain similar to prior to PCI however reduced in severity and frequency. He denies any palpitations, dizziness, syncope, diaphoresis, fatigue, LE edema, GASTON, orthopnea, PND, N/V, fever, chills, cough, abdominal pain, recent sick contacts or contact with known Covid + individuals, recent travel, diarrhea.  Pts most recent Cardiac Cath (3/1/21 @ [facility]) received JODY OM2 and JODY mLCx with residual pLAD 80%, mD1 75%, mRCA 50%, EF normal.   In light of pts risk factors and known residual CAD, pt now presents to [facility] for recommended staged PCI.

## 2021-04-09 NOTE — H&P ADULT - ASSESSMENT
61 yo Male with PMHx of HTN, HLD, CAD s/p PCI (JODY OM2 and JODY mLCx with residual pLAD 80% and mD1 75% on 3/1/21), and DM Type II who now returns to [facility] for staged PCI of residual CAD due to patient's risk factors, and persistent CCS Angina Class III Symptoms.     ASA III                 Mallampati III  Risks & benefits of procedure and alternative therapy have been explained to the patient including but not limited to: allergic reaction, bleeding w/possible need for blood transfusion, infection, renal and vascular compromise, limb damage, arrhythmia, stroke, vessel dissection/perforation, Myocardial infarction, emergent CABG. Informed consent obtained and in chart.       Patient on Aspirin 81 mg daily ( last dose 4/12/2021 AM ) and Plavix 75 mg daily (last dose 4/11/2021 AM) and reports compliance. Patient denies bleeding, BRBPR, melena, hematuria, hematemesis. Plavix 75 mg PO x 1 given prior to procedure.  59 yo Male with PMHx of HTN, HLD, CAD s/p PCI (JODY OM2 and JODY mLCx with residual pLAD 80% and mD1 75% on 3/1/21), and DM Type II who now returns to [facility] for staged PCI of residual CAD due to patient's risk factors, and persistent CCS Angina Class III Symptoms.     ASA III                 Mallampati III    Sedation Plan:   Moderate    Patient Is Suitable Candidate For Sedation:  Yes     Risks & benefits of procedure and alternative therapy have been explained to the patient including but not limited to: allergic reaction, bleeding w/possible need for blood transfusion, infection, renal and vascular compromise, limb damage, arrhythmia, stroke, vessel dissection/perforation, Myocardial infarction, emergent CABG. Informed consent obtained and in chart.       Patient on Aspirin 81 mg daily ( last dose 4/12/2021 AM ) and Plavix 75 mg daily (last dose 4/11/2021 AM) and reports compliance. Patient denies bleeding, BRBPR, melena, hematuria, hematemesis. Plavix 75 mg PO x 1 given prior to procedure.

## 2021-04-12 ENCOUNTER — TRANSCRIPTION ENCOUNTER (OUTPATIENT)
Age: 61
End: 2021-04-12

## 2021-04-12 ENCOUNTER — INPATIENT (INPATIENT)
Facility: HOSPITAL | Age: 61
LOS: 0 days | Discharge: HOME | DRG: 247 | End: 2021-04-13
Attending: INTERNAL MEDICINE | Admitting: INTERNAL MEDICINE
Payer: MEDICAID

## 2021-04-12 LAB
A1C WITH ESTIMATED AVERAGE GLUCOSE RESULT: 7.2 % — HIGH (ref 4–5.6)
ALBUMIN SERPL ELPH-MCNC: 4.5 G/DL — SIGNIFICANT CHANGE UP (ref 3.3–5)
ALP SERPL-CCNC: 75 U/L — SIGNIFICANT CHANGE UP (ref 40–120)
ALT FLD-CCNC: 30 U/L — SIGNIFICANT CHANGE UP (ref 10–45)
ANION GAP SERPL CALC-SCNC: 12 MMOL/L — SIGNIFICANT CHANGE UP (ref 5–17)
APTT BLD: 37.8 SEC — HIGH (ref 27.5–35.5)
AST SERPL-CCNC: 20 U/L — SIGNIFICANT CHANGE UP (ref 10–40)
BASOPHILS # BLD AUTO: 0.02 K/UL — SIGNIFICANT CHANGE UP (ref 0–0.2)
BASOPHILS NFR BLD AUTO: 0.4 % — SIGNIFICANT CHANGE UP (ref 0–2)
BILIRUB SERPL-MCNC: 0.4 MG/DL — SIGNIFICANT CHANGE UP (ref 0.2–1.2)
BUN SERPL-MCNC: 15 MG/DL — SIGNIFICANT CHANGE UP (ref 7–23)
CALCIUM SERPL-MCNC: 9.8 MG/DL — SIGNIFICANT CHANGE UP (ref 8.4–10.5)
CHLORIDE SERPL-SCNC: 99 MMOL/L — SIGNIFICANT CHANGE UP (ref 96–108)
CHOLEST SERPL-MCNC: 134 MG/DL — SIGNIFICANT CHANGE UP
CO2 SERPL-SCNC: 26 MMOL/L — SIGNIFICANT CHANGE UP (ref 22–31)
CREAT SERPL-MCNC: 1.07 MG/DL — SIGNIFICANT CHANGE UP (ref 0.5–1.3)
EOSINOPHIL # BLD AUTO: 0.06 K/UL — SIGNIFICANT CHANGE UP (ref 0–0.5)
EOSINOPHIL NFR BLD AUTO: 1.2 % — SIGNIFICANT CHANGE UP (ref 0–6)
ESTIMATED AVERAGE GLUCOSE: 160 MG/DL — HIGH (ref 68–114)
GLUCOSE BLDC GLUCOMTR-MCNC: 106 MG/DL — HIGH (ref 70–99)
GLUCOSE BLDC GLUCOMTR-MCNC: 133 MG/DL — HIGH (ref 70–99)
GLUCOSE BLDC GLUCOMTR-MCNC: 139 MG/DL — HIGH (ref 70–99)
GLUCOSE BLDC GLUCOMTR-MCNC: 143 MG/DL — HIGH (ref 70–99)
GLUCOSE BLDC GLUCOMTR-MCNC: 244 MG/DL — HIGH (ref 70–99)
GLUCOSE SERPL-MCNC: 141 MG/DL — HIGH (ref 70–99)
HCT VFR BLD CALC: 38.2 % — LOW (ref 39–50)
HDLC SERPL-MCNC: 41 MG/DL — SIGNIFICANT CHANGE UP
HGB BLD-MCNC: 13 G/DL — SIGNIFICANT CHANGE UP (ref 13–17)
IMM GRANULOCYTES NFR BLD AUTO: 0.2 % — SIGNIFICANT CHANGE UP (ref 0–1.5)
INR BLD: 0.96 — SIGNIFICANT CHANGE UP (ref 0.88–1.16)
LIPID PNL WITH DIRECT LDL SERPL: 64 MG/DL — SIGNIFICANT CHANGE UP
LYMPHOCYTES # BLD AUTO: 1.77 K/UL — SIGNIFICANT CHANGE UP (ref 1–3.3)
LYMPHOCYTES # BLD AUTO: 36 % — SIGNIFICANT CHANGE UP (ref 13–44)
MCHC RBC-ENTMCNC: 31 PG — SIGNIFICANT CHANGE UP (ref 27–34)
MCHC RBC-ENTMCNC: 34 GM/DL — SIGNIFICANT CHANGE UP (ref 32–36)
MCV RBC AUTO: 91 FL — SIGNIFICANT CHANGE UP (ref 80–100)
MONOCYTES # BLD AUTO: 0.5 K/UL — SIGNIFICANT CHANGE UP (ref 0–0.9)
MONOCYTES NFR BLD AUTO: 10.2 % — SIGNIFICANT CHANGE UP (ref 2–14)
NEUTROPHILS # BLD AUTO: 2.55 K/UL — SIGNIFICANT CHANGE UP (ref 1.8–7.4)
NEUTROPHILS NFR BLD AUTO: 52 % — SIGNIFICANT CHANGE UP (ref 43–77)
NON HDL CHOLESTEROL: 93 MG/DL — SIGNIFICANT CHANGE UP
NRBC # BLD: 0 /100 WBCS — SIGNIFICANT CHANGE UP (ref 0–0)
PLATELET # BLD AUTO: 227 K/UL — SIGNIFICANT CHANGE UP (ref 150–400)
POTASSIUM SERPL-MCNC: 3.9 MMOL/L — SIGNIFICANT CHANGE UP (ref 3.5–5.3)
POTASSIUM SERPL-SCNC: 3.9 MMOL/L — SIGNIFICANT CHANGE UP (ref 3.5–5.3)
PROT SERPL-MCNC: 7.7 G/DL — SIGNIFICANT CHANGE UP (ref 6–8.3)
PROTHROM AB SERPL-ACNC: 11.5 SEC — SIGNIFICANT CHANGE UP (ref 10.6–13.6)
RBC # BLD: 4.2 M/UL — SIGNIFICANT CHANGE UP (ref 4.2–5.8)
RBC # FLD: 11.6 % — SIGNIFICANT CHANGE UP (ref 10.3–14.5)
SODIUM SERPL-SCNC: 137 MMOL/L — SIGNIFICANT CHANGE UP (ref 135–145)
TRIGL SERPL-MCNC: 143 MG/DL — SIGNIFICANT CHANGE UP
WBC # BLD: 4.91 K/UL — SIGNIFICANT CHANGE UP (ref 3.8–10.5)
WBC # FLD AUTO: 4.91 K/UL — SIGNIFICANT CHANGE UP (ref 3.8–10.5)

## 2021-04-12 PROCEDURE — 93010 ELECTROCARDIOGRAM REPORT: CPT

## 2021-04-12 PROCEDURE — 93458 L HRT ARTERY/VENTRICLE ANGIO: CPT | Mod: 26,59

## 2021-04-12 PROCEDURE — 93010 ELECTROCARDIOGRAM REPORT: CPT | Mod: 77

## 2021-04-12 PROCEDURE — 92928 PRQ TCAT PLMT NTRAC ST 1 LES: CPT | Mod: LD

## 2021-04-12 PROCEDURE — 99152 MOD SED SAME PHYS/QHP 5/>YRS: CPT

## 2021-04-12 RX ORDER — DEXTROSE 50 % IN WATER 50 %
25 SYRINGE (ML) INTRAVENOUS ONCE
Refills: 0 | Status: DISCONTINUED | OUTPATIENT
Start: 2021-04-12 | End: 2021-04-12

## 2021-04-12 RX ORDER — DEXTROSE 50 % IN WATER 50 %
12.5 SYRINGE (ML) INTRAVENOUS ONCE
Refills: 0 | Status: DISCONTINUED | OUTPATIENT
Start: 2021-04-12 | End: 2021-04-12

## 2021-04-12 RX ORDER — DEXTROSE 50 % IN WATER 50 %
12.5 SYRINGE (ML) INTRAVENOUS ONCE
Refills: 0 | Status: DISCONTINUED | OUTPATIENT
Start: 2021-04-12 | End: 2021-04-13

## 2021-04-12 RX ORDER — METFORMIN HYDROCHLORIDE 850 MG/1
1 TABLET ORAL
Qty: 0 | Refills: 0 | DISCHARGE

## 2021-04-12 RX ORDER — SODIUM CHLORIDE 9 MG/ML
500 INJECTION INTRAMUSCULAR; INTRAVENOUS; SUBCUTANEOUS
Refills: 0 | Status: DISCONTINUED | OUTPATIENT
Start: 2021-04-12 | End: 2021-04-12

## 2021-04-12 RX ORDER — ATORVASTATIN CALCIUM 80 MG/1
40 TABLET, FILM COATED ORAL DAILY
Refills: 0 | Status: DISCONTINUED | OUTPATIENT
Start: 2021-04-12 | End: 2021-04-13

## 2021-04-12 RX ORDER — HYDROCHLOROTHIAZIDE 25 MG
25 TABLET ORAL DAILY
Refills: 0 | Status: DISCONTINUED | OUTPATIENT
Start: 2021-04-12 | End: 2021-04-13

## 2021-04-12 RX ORDER — SODIUM CHLORIDE 9 MG/ML
1000 INJECTION, SOLUTION INTRAVENOUS
Refills: 0 | Status: DISCONTINUED | OUTPATIENT
Start: 2021-04-12 | End: 2021-04-12

## 2021-04-12 RX ORDER — DEXTROSE 50 % IN WATER 50 %
25 SYRINGE (ML) INTRAVENOUS ONCE
Refills: 0 | Status: DISCONTINUED | OUTPATIENT
Start: 2021-04-12 | End: 2021-04-13

## 2021-04-12 RX ORDER — LOSARTAN/HYDROCHLOROTHIAZIDE 100MG-25MG
1 TABLET ORAL
Qty: 0 | Refills: 0 | DISCHARGE

## 2021-04-12 RX ORDER — LOSARTAN POTASSIUM 100 MG/1
100 TABLET, FILM COATED ORAL DAILY
Refills: 0 | Status: DISCONTINUED | OUTPATIENT
Start: 2021-04-13 | End: 2021-04-13

## 2021-04-12 RX ORDER — GLUCAGON INJECTION, SOLUTION 0.5 MG/.1ML
1 INJECTION, SOLUTION SUBCUTANEOUS ONCE
Refills: 0 | Status: DISCONTINUED | OUTPATIENT
Start: 2021-04-12 | End: 2021-04-12

## 2021-04-12 RX ORDER — INSULIN LISPRO 100/ML
VIAL (ML) SUBCUTANEOUS ONCE
Refills: 0 | Status: DISCONTINUED | OUTPATIENT
Start: 2021-04-12 | End: 2021-04-12

## 2021-04-12 RX ORDER — POTASSIUM CHLORIDE 20 MEQ
20 PACKET (EA) ORAL ONCE
Refills: 0 | Status: COMPLETED | OUTPATIENT
Start: 2021-04-12 | End: 2021-04-12

## 2021-04-12 RX ORDER — CLOPIDOGREL BISULFATE 75 MG/1
75 TABLET, FILM COATED ORAL ONCE
Refills: 0 | Status: COMPLETED | OUTPATIENT
Start: 2021-04-12 | End: 2021-04-12

## 2021-04-12 RX ORDER — AMLODIPINE BESYLATE 2.5 MG/1
1 TABLET ORAL
Qty: 0 | Refills: 0 | DISCHARGE

## 2021-04-12 RX ORDER — SODIUM CHLORIDE 9 MG/ML
1000 INJECTION, SOLUTION INTRAVENOUS
Refills: 0 | Status: DISCONTINUED | OUTPATIENT
Start: 2021-04-12 | End: 2021-04-13

## 2021-04-12 RX ORDER — SODIUM CHLORIDE 9 MG/ML
500 INJECTION INTRAMUSCULAR; INTRAVENOUS; SUBCUTANEOUS
Refills: 0 | Status: DISCONTINUED | OUTPATIENT
Start: 2021-04-12 | End: 2021-04-13

## 2021-04-12 RX ORDER — DEXTROSE 50 % IN WATER 50 %
15 SYRINGE (ML) INTRAVENOUS ONCE
Refills: 0 | Status: DISCONTINUED | OUTPATIENT
Start: 2021-04-12 | End: 2021-04-13

## 2021-04-12 RX ORDER — INSULIN LISPRO 100/ML
VIAL (ML) SUBCUTANEOUS
Refills: 0 | Status: DISCONTINUED | OUTPATIENT
Start: 2021-04-12 | End: 2021-04-13

## 2021-04-12 RX ORDER — GLIMEPIRIDE 1 MG
1 TABLET ORAL
Qty: 0 | Refills: 0 | DISCHARGE

## 2021-04-12 RX ORDER — GLUCAGON INJECTION, SOLUTION 0.5 MG/.1ML
1 INJECTION, SOLUTION SUBCUTANEOUS ONCE
Refills: 0 | Status: DISCONTINUED | OUTPATIENT
Start: 2021-04-12 | End: 2021-04-13

## 2021-04-12 RX ORDER — AMLODIPINE BESYLATE 2.5 MG/1
5 TABLET ORAL DAILY
Refills: 0 | Status: DISCONTINUED | OUTPATIENT
Start: 2021-04-13 | End: 2021-04-13

## 2021-04-12 RX ORDER — ASPIRIN/CALCIUM CARB/MAGNESIUM 324 MG
81 TABLET ORAL DAILY
Refills: 0 | Status: DISCONTINUED | OUTPATIENT
Start: 2021-04-13 | End: 2021-04-13

## 2021-04-12 RX ORDER — CLOPIDOGREL BISULFATE 75 MG/1
75 TABLET, FILM COATED ORAL DAILY
Refills: 0 | Status: DISCONTINUED | OUTPATIENT
Start: 2021-04-13 | End: 2021-04-13

## 2021-04-12 RX ORDER — SITAGLIPTIN 50 MG/1
1 TABLET, FILM COATED ORAL
Qty: 0 | Refills: 0 | DISCHARGE

## 2021-04-12 RX ORDER — DEXTROSE 50 % IN WATER 50 %
15 SYRINGE (ML) INTRAVENOUS ONCE
Refills: 0 | Status: DISCONTINUED | OUTPATIENT
Start: 2021-04-12 | End: 2021-04-12

## 2021-04-12 RX ADMIN — ATORVASTATIN CALCIUM 40 MILLIGRAM(S): 80 TABLET, FILM COATED ORAL at 21:53

## 2021-04-12 RX ADMIN — SODIUM CHLORIDE 75 MILLILITER(S): 9 INJECTION INTRAMUSCULAR; INTRAVENOUS; SUBCUTANEOUS at 07:39

## 2021-04-12 RX ADMIN — CLOPIDOGREL BISULFATE 75 MILLIGRAM(S): 75 TABLET, FILM COATED ORAL at 07:38

## 2021-04-12 RX ADMIN — Medication 4: at 12:41

## 2021-04-12 RX ADMIN — Medication 20 MILLIEQUIVALENT(S): at 08:28

## 2021-04-12 RX ADMIN — SODIUM CHLORIDE 75 MILLILITER(S): 9 INJECTION INTRAMUSCULAR; INTRAVENOUS; SUBCUTANEOUS at 11:01

## 2021-04-12 NOTE — DISCHARGE NOTE PROVIDER - HOSPITAL COURSE
59 yo Male with PMHx of HTN, HLD, CAD s/p PCI (JODY OM2 and JODY mLCx with residual pLAD 80% and mD1 75% on 3/1/21), and DM Type II who now returns to [facility] for staged PCI of residual CAD. Pt previously presented to his Cardiologist Dr. Shaik Huerta c/o non radiating substernal chest heaviness with ambulation of 0.5 block, resolved with rest, and pt underwent NST revealing moderate inferior and inferoseptal defect, normal EF and subsequently referred for cath. Pt currently endorses he feels well and reports DAPT compliance. He reports he still experiences exertional chest pain similar to prior to PCI however reduced in severity and frequency.  Pts most recent Cardiac Cath (3/1/21 @ [facility]) received JODY OM2 and JODY mLCx with residual pLAD 80%, mD1 75%, mRCA 50%, EF normal.     Patient was referred for cardiac catheterization on 4/12/21  revealing JODY X 2 pLAD procedure done via right radial hemostasis achieved with radial radar band  Pt. seen and examined at bedside this morning, without complaints and is out of bed ambulating. right radial stable without bleeding or hematoma, distal pulses intact. Vital signs stable, labs and telemetry reviewed. Home medication regime reviewed with Dr. Moore and patient is to continue taking ….. Pt. stable for discharge as per Dr. Moore and to f/u with Dr. Shaik Huerta  in 1-2 weeks. Patient has been given appropriate discharge instructions including medication regimen, access site management and follow up. Prescriptions have been e-prescribed to patient's preferred pharmacy. 61 yo Male with PMHx of HTN, HLD, CAD s/p PCI (JODY OM2 and JODY mLCx with residual pLAD 80% and mD1 75% on 3/1/21), and DM Type II who now returns to [facility] for staged PCI of residual CAD. Pt previously presented to his Cardiologist Dr. Shaik Huerta c/o non radiating substernal chest heaviness with ambulation of 0.5 block, resolved with rest, and pt underwent NST revealing moderate inferior and inferoseptal defect, normal EF and subsequently referred for cath. Pt currently endorses he feels well and reports DAPT compliance. He reports he still experiences exertional chest pain similar to prior to PCI however reduced in severity and frequency.  Pts most recent Cardiac Cath (3/1/21 @ [facility]) received JODY OM2 and JODY mLCx with residual pLAD 80%, mD1 75%, mRCA 50%, EF normal.     Patient was referred for cardiac catheterization on 4/12/21  revealing JODY X 2 pLAD procedure done via right radial hemostasis achieved with radial radar band  Pt. seen and examined at bedside this morning, without complaints and is out of bed ambulating. right radial stable without bleeding or hematoma, distal pulses intact. Vital signs stable, labs and telemetry reviewed. Home medication regime reviewed with Dr. Moore and patient is to continue taking ….. Pt. stable for discharge as per Dr. Moore and to f/u with Dr. Shaik Huerta  in 1-2 weeks. Patient has been given appropriate discharge instructions including medication regimen, access site management and follow up. Prescriptions have been e-prescribed to patient's preferred pharmacy.               Dx: Heart Failure this Admit, History of Heart Failure, Unstable Angina/ACS/NSTEMI/STEMI: YES/NO            If YES: 7 day Follow-Up Appointment Made: Yes/No, Yes: Date/Time; IF No, why not?           Cardiac Rehab (STEMI/NSTEMI/ACS/Unstable Angina/CHF):            *Education on benefits of Cardiac Rehab provided to patient: Yes/No         *Referral and Prescription Given for Cardiac Rehab : Yes/No.  If No, Why Not?         *Pt given list of locations & instructed to contact their insurance company to review list of participating providers    Reasons for No Cardiac Rehab Referral Rx (Must select 1 or more options):                Patient Refused            Medical Reason: ex needs Home Care, Home PT            Patient lacks medical coverage for Cardiac Rehab            Pt discharged to Nursing Care/[facility]/Long term Care Facility            Patient Lacks Transportation or no cardiac rehab within 60 minutes driving range            Patient already participates in Cardiac Rehab            Other: (provide details) ex: Hospice patient      AMI: Beta Blocker Prescribed: Yes/No. If no, Why not?            ACE-I/ARB Prescribed: Yes/No. If no, Why not?    Statin Prescribed (STEMI/NSTEMI/UA &/OR PCI this admission):  Yes/No; If No, Why Not?  DAPT: Prescriptions for Aspirin/Plavix/Brilinta/Effient e-prescribed to patient's pharmacy Yes/No__.                *No Aspirin/Plavix/Brilinta/Effient prescribed due to ___.     61 yo Male with PMHx of HTN, HLD, CAD s/p PCI (JODY OM2 and JODY mLCx with residual pLAD 80% and mD1 75% on 3/1/21), and DM Type II who now returns to [facility] for staged PCI of residual CAD. Pt previously presented to his Cardiologist Dr. Shaik Huerta c/o non radiating substernal chest heaviness with ambulation of 0.5 block, resolved with rest, and pt underwent NST revealing moderate inferior and inferoseptal defect, normal EF and subsequently referred for cath. Pt currently endorses he feels well and reports DAPT compliance. He reports he still experiences exertional chest pain similar to prior to PCI however reduced in severity and frequency.  Pts most recent Cardiac Cath (3/1/21 @ [facility]) received JODY OM2 and JODY mLCx with residual pLAD 80%, mD1 75%, mRCA 50%, EF normal.     Patient was referred for cardiac catheterization on 4/12/21  revealing JODY X 2 pLAD procedure done via right radial hemostasis achieved with radial radar band  Pt. seen and examined at bedside this morning, without complaints and is out of bed ambulating. right radial stable without bleeding or hematoma, distal pulses intact. Vital signs stable, labs and telemetry reviewed. Home medication regime reviewed with Dr. Moore and patient is to continue taking Aspirin 81mg, Plavix 75mg, Atorvastatin 40mg, amlodipine 5Pt. stable for discharge as per Dr. Moore and to f/u with Dr. Shaik Huerta  in 1-2 weeks. Patient has been given appropriate discharge instructions including medication regimen, access site management and follow up. Prescriptions have been e-prescribed to patient's preferred pharmacy.               Dx: Heart Failure this Admit, History of Heart Failure, Unstable Angina/ACS/NSTEMI/STEMI: YES/NO            If YES: 7 day Follow-Up Appointment Made: No, Yes: Date/Time;close follow up in 1-2 weeks wGold Huerta            Cardiac Rehab (STEMI/NSTEMI/ACS/Unstable Angina/CHF):            *Education on benefits of Cardiac Rehab provided to patient: Yes/         *Referral and Prescription Given for Cardiac Rehab : Yes.  If No, Why Not?         *Pt given list of locations & instructed to contact their insurance company to review list of participating providers YES    AMI: Beta Blocker Prescribed: No. If no, Why not? To be started as outpatient             ACE-I/ARB Prescribed: Yes. If no, Why not?    Statin Prescribed (STEMI/NSTEMI/UA &/OR PCI this admission):  Yes; If No, Why Not?  DAPT: Prescriptions for Aspirin/Plavix e-prescribed to patient's pharmacy Yes               59 yo Male with PMHx of HTN, HLD, CAD s/p PCI (JODY OM2 and JODY mLCx with residual pLAD 80% and mD1 75% on 3/1/21), and DM Type II who now returns to [facility] for staged PCI of residual CAD. Pt previously presented to his Cardiologist Dr. Shaik Huerta c/o non radiating substernal chest heaviness with ambulation of 0.5 block, resolved with rest, and pt underwent NST revealing moderate inferior and inferoseptal defect, normal EF and subsequently referred for cath. Pt currently endorses he feels well and reports DAPT compliance. He reports he still experiences exertional chest pain similar to prior to PCI however reduced in severity and frequency.  Pts most recent Cardiac Cath (3/1/21 @ [facility]) received JODY OM2 and JODY mLCx with residual pLAD 80%, mD1 75%, mRCA 50%, EF normal.     Patient was referred for cardiac catheterization on 4/12/21  revealing JODY X 2 pLAD procedure done via right radial hemostasis achieved with radial radar band  Pt. seen and examined at bedside this morning, without complaints and is out of bed ambulating. right radial stable without bleeding or hematoma, distal pulses intact. Vital signs stable, labs and telemetry reviewed. Home medication regime reviewed with Dr. Moore and patient is to continue taking Aspirin 81mg, Plavix 75mg, Atorvastatin 40mg, amlodipine 5Pt. stable for discharge as per Dr. Moore and to f/u with Dr. Shaik Huerta  in 1-2 weeks. Patient has been given appropriate discharge instructions including medication regimen, access site management and follow up. Prescriptions have been e-prescribed to patient's preferred pharmacy.               Dx: Heart Failure this Admit, History of Heart Failure, Unstable Angina/ACS/NSTEMI/STEMI: YES/NO            If YES: 7 day Follow-Up Appointment Made: No, Yes: Date/Time;close follow up in 1-2 weeks wGold Huerta            Cardiac Rehab (STEMI/NSTEMI/ACS/Unstable Angina/CHF):            *Education on benefits of Cardiac Rehab provided to patient: Yes/         *Referral and Prescription Given for Cardiac Rehab : Yes.  If No, Why Not?         *Pt given list of locations & instructed to contact their insurance company to review list of participating providers YES    AMI: Beta Blocker Prescribed: No. If no, Why not? To be started as outpatient blood pressures well controlled             ACE-I/ARB Prescribed: Yes. If no, Why not?    Statin Prescribed (STEMI/NSTEMI/UA &/OR PCI this admission):  Yes; If No, Why Not?  DAPT: Prescriptions for Aspirin/Plavix e-prescribed to patient's pharmacy Yes               59 yo Male with PMHx of HTN, HLD, CAD s/p PCI (JODY OM2 and JODY mLCx with residual pLAD 80% and mD1 75% on 3/1/21), and DM Type II who now returns to [facility] for staged PCI of residual CAD. Pt previously presented to his Cardiologist Dr. Shaik Huerta c/o non radiating substernal chest heaviness with ambulation of 0.5 block, resolved with rest, and pt underwent NST revealing moderate inferior and inferoseptal defect, normal EF and subsequently referred for cath. Pt currently endorses he feels well and reports DAPT compliance. He reports he still experiences exertional chest pain similar to prior to PCI however reduced in severity and frequency.  Pts most recent Cardiac Cath (3/1/21 @ [facility]) received JODY OM2 and JODY mLCx with residual pLAD 80%, mD1 75%, mRCA 50%, EF normal.     Patient was referred for cardiac catheterization on 4/12/21  revealing JODY X 2 pLAD procedure done via right radial hemostasis achieved with radial radar band  Pt. seen and examined at bedside this morning, without complaints and is out of bed ambulating. right radial stable without bleeding or hematoma, distal pulses intact. Vital signs stable, labs and telemetry reviewed. Home medication regime reviewed with Dr. Moore and patient is to continue taking Aspirin 81mg, Plavix 75mg, Atorvastatin 40mg, amlodipine 5Pt. stable for discharge as per Dr. Moore and to f/u with Dr. Shaik Huerta  in 1-2 weeks. Patient has been given appropriate discharge instructions including medication regimen, access site management and follow up. Prescriptions have been e-prescribed to patient's preferred pharmacy.               Dx: Heart Failure this Admit, History of Heart Failure, Unstable Angina/ACS/NSTEMI/STEMI: YES/NO            If YES: 7 day Follow-Up Appointment Made: No, Yes: Date/Time;close follow up in 1-2 weeks wGold Huerta            Cardiac Rehab (STEMI/NSTEMI/ACS/Unstable Angina/CHF):            *Education on benefits of Cardiac Rehab provided to patient: Yes/         *Referral and Prescription Given for Cardiac Rehab : Yes.  If No, Why Not?         *Pt given list of locations & instructed to contact their insurance company to review list of participating providers YES    AMI: Beta Blocker Prescribed: No. If no, Why not? To be started as outpatient blood pressures well controlled             ACE-I/ARB Prescribed: Yes. If no, Why not?    Statin Prescribed (STEMI/NSTEMI/UA &/OR PCI this admission):  Yes; If No, Why Not?  DAPT: Prescriptions for Aspirin/Plavix e-prescribed to patient's pharmacy Yes

## 2021-04-12 NOTE — DISCHARGE NOTE PROVIDER - NSDCMRMEDTOKEN_GEN_ALL_CORE_FT
amLODIPine 5 mg oral tablet: 1 tab(s) orally once a day  aspirin 81 mg oral delayed release tablet: 1 tab(s) orally once a day  atorvastatin 40 mg oral tablet: 1 tab(s) orally once a day (at bedtime)  clopidogrel 75 mg oral tablet: 1 tab(s) orally once a day  glimepiride 2 mg oral tablet: 1 tab(s) orally once a day  Januvia 50 mg oral tablet: 1 tab(s) orally once a day  losartan-hydrochlorothiazide 100 mg-25 mg oral tablet: 1 tab(s) orally once a day  metFORMIN 850 mg oral tablet: 1 tab(s) orally 2 times a day (with meals)

## 2021-04-12 NOTE — DISCHARGE NOTE PROVIDER - CARE PROVIDER_API CALL
Shaik LALI Huerta)  Cardiology; Nuclear Medicine  [address] [place] [address]  Phone: [phone]  Fax: [phone]  Follow Up Time: 1 week

## 2021-04-12 NOTE — DISCHARGE NOTE PROVIDER - NSDCCPCAREPLAN_GEN_ALL_CORE_FT
PRINCIPAL DISCHARGE DIAGNOSIS  Diagnosis: Coronary artery disease  Assessment and Plan of Treatment: -You underwent a cardiac catheterization 4/12/21  and the blockage in your left anterior descedning artery was opened with stent placement. NEVER MISS A DOSE OF ASPIRIN OR PLAVIX; IF YOU DO, YOU ARE AT RISK OF YOUR STENT CLOSING AND HAVING A HEART ATTACK. DO NOT STOP THESE TWO MEDICATIONS UNLESS INSTRUCTED TO DO SO BY YOUR CARDIOLOGIST. Your procedure was done through your groin or your wrist. You do not need to keep this area covered and you may shower. Please avoid any heavy lifting  (no more than 3 to 5 lbs) or strenuous activity for five days. If you develop any swelling, bleeding, hardening of the skin (hematoma formation), acute pain, numbness/tingling  in your arm please contact your doctor immediately or call our 24/7 line: [phone] Please return to the hospital/seek immediate medical attention if worsening of symptoms- including not limited to chest pain, shortness of breath. Please follow up with Dr. Shaik Huerta in 1-2 weeks. Please call his office and make an appointment to see him. Please continue a heart healthy diet low in sodium, cholesterol, and fat.      SECONDARY DISCHARGE DIAGNOSES  Diagnosis: Hypertension  Assessment and Plan of Treatment: Hypertension, commonly called high blood pressure, is when the force of blood pumping through your arteries is too strong. Hypertension forces your heart to work harder to pump blood. Your arteries may become narrow or stiff. Having untreated or uncontrolled hypertension for a long period of time can cause heart attack, stroke, kidney disease, and other problems.   Please continue to taking ****   Maintain a healthy lifestyle and follow up with your primary care physician. For blood pressures at home that are too high or low please see your doctor or go to the Emergency Room as necessary.    Diagnosis: Hyperlipidemia  Assessment and Plan of Treatment: Your LDL is (INSERT) mg/dL and your goal LDL is less than 100 mg/dL. LDL is also known as "bad cholesterol" because it takes cholesterol to your arteries, where it may collect in artery walls. Too much cholesterol in your arteries may lead to a buildup of plaque known as atherosclerosis and can cause heart disease.  You can lower your LDL with medications and lifestyle modifications such as weight loss in overweight patients, aerobic exercise, and eating diets lower in saturated fats      Diagnosis: Diabetes mellitus  Assessment and Plan of Treatment: DO NOT TAKE your Metformin for two days. This medication can interact with the contrast used during your procedure therefore we want to ensure the contrast has left your body prior to you restarting your Metformin. Maintain a low Carbohydrate diet. Check your blood sugar regularly. For blood sugar that is too high or too low please call your Doctor or go to the Emergency Room as necessary. Please follow up with your  Your Hemoglobin A1c is (INSERT). Your goal Hemoglobin A1c is less than 7.0%, This number measures your average blood sugar level over the last three months. Ways to lower this number include: diet, exercise, monitoring your fingersticks, adhering to your medication regimen and regular follow up during you Endocrinologist/Primary Care Doctor.     PRINCIPAL DISCHARGE DIAGNOSIS  Diagnosis: Coronary artery disease  Assessment and Plan of Treatment: -You underwent a cardiac catheterization 4/12/21  and the blockage in your left anterior descedning artery was opened with stent placement. NEVER MISS A DOSE OF ASPIRIN OR PLAVIX; IF YOU DO, YOU ARE AT RISK OF YOUR STENT CLOSING AND HAVING A HEART ATTACK. DO NOT STOP THESE TWO MEDICATIONS UNLESS INSTRUCTED TO DO SO BY YOUR CARDIOLOGIST. Your procedure was done through your groin or your wrist. You do not need to keep this area covered and you may shower. Please avoid any heavy lifting  (no more than 3 to 5 lbs) or strenuous activity for five days. If you develop any swelling, bleeding, hardening of the skin (hematoma formation), acute pain, numbness/tingling  in your arm please contact your doctor immediately or call our 24/7 line: [phone] Please return to the hospital/seek immediate medical attention if worsening of symptoms- including not limited to chest pain, shortness of breath. Please follow up with Dr. Shaik Huerta in 1-2 weeks. Please call his office and make an appointment to see him. Please continue a heart healthy diet low in sodium, cholesterol, and fat.      SECONDARY DISCHARGE DIAGNOSES  Diagnosis: Hypertension  Assessment and Plan of Treatment: Hypertension, commonly called high blood pressure, is when the force of blood pumping through your arteries is too strong. Hypertension forces your heart to work harder to pump blood. Your arteries may become narrow or stiff. Having untreated or uncontrolled hypertension for a long period of time can cause heart attack, stroke, kidney disease, and other problems.   Please continue to taking Amlodipine 5mg, Losartan- HCTz 100/25mg   Maintain a healthy lifestyle and follow up with your primary care physician. For blood pressures at home that are too high or low please see your doctor or go to the Emergency Room as necessary.    Diagnosis: Hyperlipidemia  Assessment and Plan of Treatment: Too much cholesterol in your arteries may lead to a buildup of plaque known as atherosclerosis and can cause heart disease.  You can lower your LDL with medications and lifestyle modifications such as weight loss in overweight patients, aerobic exercise, and eating diets lower in saturated fats  Please continue taking Atorvastatin 40mg    Diagnosis: Diabetes mellitus  Assessment and Plan of Treatment: DO NOT TAKE your Metformin for two days. This medication can interact with the contrast used during your procedure therefore we want to ensure the contrast has left your body prior to you restarting your Metformin. Maintain a low Carbohydrate diet. Check your blood sugar regularly. For blood sugar that is too high or too low please call your Doctor or go to the Emergency Room as necessary. Please follow up with your

## 2021-04-13 ENCOUNTER — TRANSCRIPTION ENCOUNTER (OUTPATIENT)
Age: 61
End: 2021-04-13

## 2021-04-13 VITALS — TEMPERATURE: 99 F

## 2021-04-13 LAB
ANION GAP SERPL CALC-SCNC: 10 MMOL/L — SIGNIFICANT CHANGE UP (ref 5–17)
BUN SERPL-MCNC: 16 MG/DL — SIGNIFICANT CHANGE UP (ref 7–23)
CALCIUM SERPL-MCNC: 9.3 MG/DL — SIGNIFICANT CHANGE UP (ref 8.4–10.5)
CHLORIDE SERPL-SCNC: 99 MMOL/L — SIGNIFICANT CHANGE UP (ref 96–108)
CO2 SERPL-SCNC: 25 MMOL/L — SIGNIFICANT CHANGE UP (ref 22–31)
COVID-19 SPIKE DOMAIN AB INTERP: POSITIVE
COVID-19 SPIKE DOMAIN ANTIBODY RESULT: >250 U/ML — HIGH
CREAT SERPL-MCNC: 1.04 MG/DL — SIGNIFICANT CHANGE UP (ref 0.5–1.3)
GLUCOSE BLDC GLUCOMTR-MCNC: 198 MG/DL — HIGH (ref 70–99)
GLUCOSE SERPL-MCNC: 189 MG/DL — HIGH (ref 70–99)
HCT VFR BLD CALC: 36.6 % — LOW (ref 39–50)
HGB BLD-MCNC: 12.4 G/DL — LOW (ref 13–17)
MAGNESIUM SERPL-MCNC: 1.7 MG/DL — SIGNIFICANT CHANGE UP (ref 1.6–2.6)
MCHC RBC-ENTMCNC: 30.5 PG — SIGNIFICANT CHANGE UP (ref 27–34)
MCHC RBC-ENTMCNC: 33.9 GM/DL — SIGNIFICANT CHANGE UP (ref 32–36)
MCV RBC AUTO: 90.1 FL — SIGNIFICANT CHANGE UP (ref 80–100)
NRBC # BLD: 0 /100 WBCS — SIGNIFICANT CHANGE UP (ref 0–0)
PLATELET # BLD AUTO: 237 K/UL — SIGNIFICANT CHANGE UP (ref 150–400)
POTASSIUM SERPL-MCNC: 3.8 MMOL/L — SIGNIFICANT CHANGE UP (ref 3.5–5.3)
POTASSIUM SERPL-SCNC: 3.8 MMOL/L — SIGNIFICANT CHANGE UP (ref 3.5–5.3)
RBC # BLD: 4.06 M/UL — LOW (ref 4.2–5.8)
RBC # FLD: 11.5 % — SIGNIFICANT CHANGE UP (ref 10.3–14.5)
SARS-COV-2 IGG+IGM SERPL QL IA: >250 U/ML — HIGH
SARS-COV-2 IGG+IGM SERPL QL IA: POSITIVE
SODIUM SERPL-SCNC: 134 MMOL/L — LOW (ref 135–145)
WBC # BLD: 4.42 K/UL — SIGNIFICANT CHANGE UP (ref 3.8–10.5)
WBC # FLD AUTO: 4.42 K/UL — SIGNIFICANT CHANGE UP (ref 3.8–10.5)

## 2021-04-13 PROCEDURE — C1725: CPT

## 2021-04-13 PROCEDURE — 80061 LIPID PANEL: CPT

## 2021-04-13 PROCEDURE — C1887: CPT

## 2021-04-13 PROCEDURE — 93010 ELECTROCARDIOGRAM REPORT: CPT

## 2021-04-13 PROCEDURE — 85027 COMPLETE CBC AUTOMATED: CPT

## 2021-04-13 PROCEDURE — 80053 COMPREHEN METABOLIC PANEL: CPT

## 2021-04-13 PROCEDURE — 83036 HEMOGLOBIN GLYCOSYLATED A1C: CPT

## 2021-04-13 PROCEDURE — 86769 SARS-COV-2 COVID-19 ANTIBODY: CPT

## 2021-04-13 PROCEDURE — 85610 PROTHROMBIN TIME: CPT

## 2021-04-13 PROCEDURE — 85730 THROMBOPLASTIN TIME PARTIAL: CPT

## 2021-04-13 PROCEDURE — 36415 COLL VENOUS BLD VENIPUNCTURE: CPT

## 2021-04-13 PROCEDURE — 80048 BASIC METABOLIC PNL TOTAL CA: CPT

## 2021-04-13 PROCEDURE — C1894: CPT

## 2021-04-13 PROCEDURE — 93005 ELECTROCARDIOGRAM TRACING: CPT

## 2021-04-13 PROCEDURE — 85025 COMPLETE CBC W/AUTO DIFF WBC: CPT

## 2021-04-13 PROCEDURE — C1769: CPT

## 2021-04-13 PROCEDURE — 83735 ASSAY OF MAGNESIUM: CPT

## 2021-04-13 PROCEDURE — 99239 HOSP IP/OBS DSCHRG MGMT >30: CPT

## 2021-04-13 PROCEDURE — 82962 GLUCOSE BLOOD TEST: CPT

## 2021-04-13 PROCEDURE — C1874: CPT

## 2021-04-13 RX ORDER — POTASSIUM CHLORIDE 20 MEQ
20 PACKET (EA) ORAL ONCE
Refills: 0 | Status: COMPLETED | OUTPATIENT
Start: 2021-04-13 | End: 2021-04-13

## 2021-04-13 RX ORDER — CLOPIDOGREL BISULFATE 75 MG/1
1 TABLET, FILM COATED ORAL
Qty: 30 | Refills: 11
Start: 2021-04-13 | End: 2022-04-07

## 2021-04-13 RX ORDER — ASPIRIN/CALCIUM CARB/MAGNESIUM 324 MG
1 TABLET ORAL
Qty: 30 | Refills: 11
Start: 2021-04-13 | End: 2022-04-07

## 2021-04-13 RX ORDER — MAGNESIUM SULFATE 500 MG/ML
2 VIAL (ML) INJECTION ONCE
Refills: 0 | Status: COMPLETED | OUTPATIENT
Start: 2021-04-13 | End: 2021-04-13

## 2021-04-13 RX ADMIN — Medication 20 MILLIEQUIVALENT(S): at 09:01

## 2021-04-13 RX ADMIN — Medication 25 MILLIGRAM(S): at 05:38

## 2021-04-13 RX ADMIN — CLOPIDOGREL BISULFATE 75 MILLIGRAM(S): 75 TABLET, FILM COATED ORAL at 09:02

## 2021-04-13 RX ADMIN — AMLODIPINE BESYLATE 5 MILLIGRAM(S): 2.5 TABLET ORAL at 05:38

## 2021-04-13 RX ADMIN — Medication 81 MILLIGRAM(S): at 09:01

## 2021-04-13 RX ADMIN — Medication 2: at 07:20

## 2021-04-13 RX ADMIN — Medication 50 GRAM(S): at 09:01

## 2021-04-13 NOTE — DISCHARGE NOTE NURSING/CASE MANAGEMENT/SOCIAL WORK - PATIENT PORTAL LINK FT
You can access the FollowMyHealth Patient Portal offered by [facility] by registering at the following website: [email or web address]. By joining [facility]’s FollowMyHealth portal, you will also be able to view your health information using other applications (apps) compatible with our system.

## 2021-04-15 PROBLEM — I25.10 ATHEROSCLEROTIC HEART DISEASE OF NATIVE CORONARY ARTERY WITHOUT ANGINA PECTORIS: Chronic | Status: ACTIVE | Noted: 2021-04-09

## 2021-04-16 DIAGNOSIS — Z95.5 PRESENCE OF CORONARY ANGIOPLASTY IMPLANT AND GRAFT: ICD-10-CM

## 2021-04-16 DIAGNOSIS — E11.9 TYPE 2 DIABETES MELLITUS WITHOUT COMPLICATIONS: ICD-10-CM

## 2021-04-16 DIAGNOSIS — Z79.82 LONG TERM (CURRENT) USE OF ASPIRIN: ICD-10-CM

## 2021-04-16 DIAGNOSIS — I25.110 ATHEROSCLEROTIC HEART DISEASE OF NATIVE CORONARY ARTERY WITH UNSTABLE ANGINA PECTORIS: ICD-10-CM

## 2021-04-16 DIAGNOSIS — I10 ESSENTIAL (PRIMARY) HYPERTENSION: ICD-10-CM

## 2021-04-16 DIAGNOSIS — Z79.84 LONG TERM (CURRENT) USE OF ORAL HYPOGLYCEMIC DRUGS: ICD-10-CM

## 2021-04-16 DIAGNOSIS — Z79.02 LONG TERM (CURRENT) USE OF ANTITHROMBOTICS/ANTIPLATELETS: ICD-10-CM

## 2021-04-16 DIAGNOSIS — Z82.3 FAMILY HISTORY OF STROKE: ICD-10-CM

## 2021-04-16 DIAGNOSIS — E78.5 HYPERLIPIDEMIA, UNSPECIFIED: ICD-10-CM

## 2021-04-20 ENCOUNTER — APPOINTMENT (OUTPATIENT)
Dept: DISASTER EMERGENCY | Facility: OTHER | Age: 61
End: 2021-04-20

## 2021-09-03 NOTE — DISCHARGE NOTE PROVIDER - PROVIDER RX CONTACT NUMBER
[phone] Unable to reach patient; LM advising labs were WNL and refill sent to local pharmacy. Advised I would sent this in a [employer] message as well. Asked patient to call me with any questions or concerns. Kezia Arnett, RN on 9/3/2021 at 10:47 AM      ----- Message from Fabby Seymour RN sent at 9/3/2021  7:31 AM CDT -----  Regarding: FW: FOLLOW UP BMP    ----- Message -----  From: Shawna Marcum PA  Sent: 9/2/2021   3:39 PM CDT  To: Cardiology Ph Nurse-  Subject: FOLLOW UP BMP                                    Team:  Her BMP was not resulted today as it sounds like she got it drawn elsewhere and its a sendout.    Please watch for her BMP Friday---and call patient with results. She needs a new 90 day potassium refill but I didn't want to do it until we knew her K was ok on current dose.     If everything looks good, tell her she can try to change the bumex to 6mg BID (instead of 4mg TID) as discussed, and send refill for K to her pharmacy.    If K is low or renal function worse please text/call me tomorrow. I have some family in town but I should be around.     Pelon Matos